# Patient Record
(demographics unavailable — no encounter records)

---

## 2025-03-26 NOTE — CHIEF COMPLAINT
[FreeTextEntry1] : Ragini presents today with c/o recurrent yeast infections every time she is placed on antibiotic therapy for diverticulitis. She states GI issues an Rx for Fluconazole but that it does not always provide relief and she has been using Terconazole externally.  She states today she has some external itching. She is a diabetic and is noted to have increased glucose in her urine.

## 2025-04-15 NOTE — ASSESSMENT
[FreeTextEntry1] : Ms Boles presents to the office accompanied by her sister-in-law to discuss ongoing management of her sigmoid diverticulitis which persists despite multiple rounds of Augmentin.  Recent colonoscopy from 1/2025 was otherwise unremarkable.  I discussed with the patient that based on her clinical history and CT findings, she appears to have smoldering and persistent sigmoid diverticulitis that has never completely resolved itself.  We discussed the option of hospital admission with outpatient IV antibiotics for at least 2 to 3 weeks.  She declined this option once I clarified that IV antibiotics did not involve 1 dosage of medication and then prompt discharge from the hospital but rather home administration of nightly abx for several weeks with repeat imaging to ensure resolution or not, of diverticulitis. I also advised her that IV antibiotics did not prevent or preclude the need for surgical intervention.  We then had a lengthy discussion regarding elective major surgery with multiple risks was discussed. We discussed the robotic/laparoscopic plan of approach with possible conversion to open should the operation not proceed in as timely a manner as expected.  The anatomy was defined and the risk of anastomotic leak was detailed. Leak rate is  ~5% despite our efforts to ensure a healthy, properly oriented, well perfused anastomosis that is not under tension.  Should such  a complication arise, depending on the magnitude, percutaneous drainage, diverging ileostomy or formation of an end colostomy are all options that will be pursued to treat the pelvic sepsis. Postoperative complications such just wound infection, dehiscence, prolonged postoperative ileus were all addressed.  Duration of surgery, length of hospitalization, criterion for discharge, length of convalescence were detailed. We also discussed the need to complete an oral abx and mechanical bowel prep prior to surgery, in addition to obtaining medical clearance and presurgical testing.  She notes that she has an upcoming event over the weekend and some tasks for her job that need to be addressed before she could proceed with surgery.  She requested another round of oral antibiotics with the insight that these antibiotics would likely continue to be ineffective in treating her persistent sigmoid diverticulitis given past history.  She was advised to seek hospital evaluation if left lower quadrant pain worsens while on the oral antibiotics.  She is aware that urgent surgery would more likely result in some form of a temporary ostomy bag then scheduling for elective surgery.  She would like an additional week to decide on whether to proceed with surgical intervention and will then let our offices know.  In the interim, continue a low fiber diet and another round of oral antibiotics will be prescribed.  All questions and concerns by the patient and her sister-in-law were addressed.

## 2025-04-15 NOTE — HISTORY OF PRESENT ILLNESS
[FreeTextEntry1] : Ms Boles presents to the office for consultation, accompanied by her sister-in-law, to discuss smoldering/persistent episodes of sigmoid diverticulitis beginning 3/2/2025.  She had a colonoscopy performed on 1/8/2025 which was otherwise unremarkable aside from the diverticulosis.  Since the beginning of March, she has been on Augmentin for left lower quadrant pain and when it is discontinued, she has recurrent abdominal pain.  A recent CT A/P from 1 week ago, continues to show diverticulitis despite antibiotic therapy.  She is adhering to a low fiber diet and has issues with constipation which she is treating with Dulcolax.  Here now for evaluation for possible surgery versus ongoing medical management.

## 2025-04-15 NOTE — CONSULT LETTER
[Dear  ___] : Dear  [unfilled], [Consult Letter:] : I had the pleasure of evaluating your patient, [unfilled]. [Please see my note below.] : Please see my note below. [Consult Closing:] : Thank you very much for allowing me to participate in the care of this patient.  If you have any questions, please do not hesitate to contact me. [Sincerely,] : Sincerely, [FreeTextEntry3] : Summer Paniagua MD [DrSis  ___] : Dr. PARK

## 2025-04-15 NOTE — PHYSICAL EXAM
[No Rash or Lesion] : No rash or lesion [Alert] : alert [Oriented to Person] : oriented to person [Oriented to Place] : oriented to place [Oriented to Time] : oriented to time [Calm] : calm [de-identified] : No apparent distress [de-identified] : Normocephalic atraumatic [de-identified] : Moving all extremities x 4

## 2025-05-12 NOTE — HISTORY OF PRESENT ILLNESS
[FreeTextEntry1] : Patient is a 59-year-old female who presents for a routine annual gynecologic examination.  Patient has no complaints.  Patient uses estradiol vaginal cream for vaginal atrophy.  Patient is scheduled for bowel resection for diverticulitis.  Patient's last mammogram and bone density test were October 2024.  Patient with a history of hysterectomy in the remote past.

## 2025-05-12 NOTE — PHYSICAL EXAM
[Appropriately responsive] : appropriately responsive [Alert] : alert [No Acute Distress] : no acute distress [No Lymphadenopathy] : no lymphadenopathy [Regular Rate Rhythm] : regular rate rhythm [No Murmurs] : no murmurs [Clear to Auscultation B/L] : clear to auscultation bilaterally [Soft] : soft [Non-tender] : non-tender [Non-distended] : non-distended [No HSM] : No HSM [No Lesions] : no lesions [No Mass] : no mass [Oriented x3] : oriented x3 [Examination Of The Breasts] : a normal appearance [No Masses] : no breast masses were palpable [Vulvar Atrophy] : vulvar atrophy [Labia Majora] : normal [Labia Minora] : normal [Atrophy] : atrophy [Normal] : normal [Absent] : absent [Uterine Adnexae] : normal [No Tenderness] : no tenderness [Nl Sphincter Tone] : normal sphincter tone [FreeTextEntry9] : Hemoccult negative

## 2025-06-11 NOTE — PHYSICAL EXAM
[No Rash or Lesion] : No rash or lesion [Alert] : alert [Oriented to Person] : oriented to person [Oriented to Place] : oriented to place [Oriented to Time] : oriented to time [Calm] : calm [de-identified] : Incisions clean/dry/intact; soft, nontender and nondistended [de-identified] : No apparent distress [de-identified] : Normocephalic atraumatic [de-identified] : Moving all extremities x 4

## 2025-06-11 NOTE — CONSULT LETTER
[Dear  ___] : Dear  [unfilled], [Consult Letter:] : I had the pleasure of evaluating your patient, [unfilled]. [Please see my note below.] : Please see my note below. [Sincerely,] : Sincerely, [Consult Closing:] : Thank you very much for allowing me to participate in the care of this patient.  If you have any questions, please do not hesitate to contact me. [DrSis  ___] : Dr. PARK [FreeTextEntry3] : Summer Paniagua MD

## 2025-06-11 NOTE — ASSESSMENT
[FreeTextEntry1] : Ms Boles presents to the office accompanied by her sister-in-law to discuss ongoing management of her sigmoid diverticulitis which persists despite multiple rounds of Augmentin.  Recent colonoscopy from 1/2025 was otherwise unremarkable.  I discussed with the patient that based on her clinical history and CT findings, she appears to have smoldering and persistent sigmoid diverticulitis that has never completely resolved itself.  We discussed the option of hospital admission with outpatient IV antibiotics for at least 2 to 3 weeks.  She declined this option once I clarified that IV antibiotics did not involve 1 dosage of medication and then prompt discharge from the hospital but rather home administration of nightly abx for several weeks with repeat imaging to ensure resolution or not, of diverticulitis. I also advised her that IV antibiotics did not prevent or preclude the need for surgical intervention.  We then had a lengthy discussion regarding elective major surgery with multiple risks was discussed. We discussed the robotic/laparoscopic plan of approach with possible conversion to open should the operation not proceed in as timely a manner as expected.  The anatomy was defined and the risk of anastomotic leak was detailed. Leak rate is  ~5% despite our efforts to ensure a healthy, properly oriented, well perfused anastomosis that is not under tension.  Should such  a complication arise, depending on the magnitude, percutaneous drainage, diverging ileostomy or formation of an end colostomy are all options that will be pursued to treat the pelvic sepsis. Postoperative complications such just wound infection, dehiscence, prolonged postoperative ileus were all addressed.  Duration of surgery, length of hospitalization, criterion for discharge, length of convalescence were detailed. We also discussed the need to complete an oral abx and mechanical bowel prep prior to surgery, in addition to obtaining medical clearance and presurgical testing.  She notes that she has an upcoming event over the weekend and some tasks for her job that need to be addressed before she could proceed with surgery.  She requested another round of oral antibiotics with the insight that these antibiotics would likely continue to be ineffective in treating her persistent sigmoid diverticulitis given past history.  She was advised to seek hospital evaluation if left lower quadrant pain worsens while on the oral antibiotics.  She is aware that urgent surgery would more likely result in some form of a temporary ostomy bag then scheduling for elective surgery.  She would like an additional week to decide on whether to proceed with surgical intervention and will then let our offices know.  In the interim, continue a low fiber diet and another round of oral antibiotics will be prescribed.  All questions and concerns by the patient and her sister-in-law were addressed.  6/11/25 s/p Robotic sigmoid 5/22/25.  Recovering as expected.  Overall, reassured.  Okay to liberalize diet.  Encouraged ibuprofen and a heating pad to right lower quadrant wound.  Follow-up as needed.

## 2025-06-11 NOTE — HISTORY OF PRESENT ILLNESS
[FreeTextEntry1] : Ms Boles presents to the office for consultation, accompanied by her sister-in-law, to discuss smoldering/persistent episodes of sigmoid diverticulitis beginning 3/2/2025.  She had a colonoscopy performed on 1/8/2025 which was otherwise unremarkable aside from the diverticulosis.  Since the beginning of March, she has been on Augmentin for left lower quadrant pain and when it is discontinued, she has recurrent abdominal pain.  A recent CT A/P from 1 week ago, continues to show diverticulitis despite antibiotic therapy.  She is adhering to a low fiber diet and has issues with constipation which she is treating with Dulcolax.  Here now for evaluation for possible surgery versus ongoing medical management.  6/11/25 s/p Robotic sigmoid 5/22/25. Has RLQ pain at port side.  Reports bowel movements are inconsistent though admits to poor p.o. intake over the last few weeks.  Here now for follow-up.